# Patient Record
(demographics unavailable — no encounter records)

---

## 2024-11-20 NOTE — HISTORY OF PRESENT ILLNESS
[FreeTextEntry1] : Mrs. Aravind Manuel presented to the office today for follow-up cardiac evaluation.  I evaluated the patient on 2/7/2023 in initial cardiology consultation.  She was referred here at that time by Dr. Mariscal for further evaluation and management of hypertension.  I last evaluated the patient in the office on 7/31/2024.  The patient is a 69-year-old female with a history of hypertension, pre-diabetes, rheumatoid arthritis, osteoarthritis of the knees (status post R TKR 4/26/2023), GERD (including esophagitis), an esophageal stricture, benign colon polyps (4 tubular adenomas resected during initial colonoscopy 4/14/2023), and vitamin D deficiency.  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  The patient has been doing well from a cardiac symptomatic standpoint since her previous visit here on 2/15/2024.  Specifically, she does not report having experienced chest discomfort or dyspnea on exertion in association with her activities.  She has not noted orthopnea, paroxysmal nocturnal dyspnea, or lower extremity edema.  She has infrequently experienced randomly-occurring momentary palpitations ("thumps") over the years, however, she has not experience recurrence of these palpitations since her previous visit here.  She has not experienced any sustained episodes of palpitations or a sensation of tachycardia.  She has not experienced any episodes of presyncope or syncope.  While vacationing in [place] in February 2024, the patient developed nausea/vomiting and diarrhea, for which she was hospitalized in [place] and treated with intravenous hydration and Cipro.  Upon returning home to [place] in March 2024, she was referred for a CT scan of the abdomen/pelvis, which revealed an enlarged uterus, and ovarian cyst, and small pleural effusions, for which she was referred for evaluation by her gynecologist.  She was admitted to [facility] on 3/16/2024 for further evaluation and management, where a chest CTA revealed a left lower lobe pulmonary embolism.  A heparin drip was subsequently initiated.  There was concern that the patient had leiomyosarcoma with cerebella brain metastases.  She was transferred to [facility] on 3/17/2024.  Echocardiography revealed normal left ventricular systolic function (EF 64%) and preserved right ventricular function.  A bedside endometrial biopsy performed on 3/17/2024 was non-diagnostic.  The patient subsequently underwent a lumbar puncture on 3/20/2024 (negative for malignant cells) and hysteroscopy with a uterine biopsy on 3/20/2024, which was interpreted as revealing a smooth muscle neoplasm.  [facility]  It was discovered that the source of her pulmonary embolism was a left femoral DVT.  It was determined that what were thought to be cerebellar metastases were actually embolic cerebellar infarcts.  The patient eventually underwent a ERIC/BSO procedure, with a biopsy revealing low-grade stage Ib leiomyosarcoma.  In view of the suspected embolic cerebellar infarcts detected in March 2024, a 2-week extended Holter monitor study was initiated on 6/29/2024, which did not detect any episodes of paroxysmal atrial fibrillation.  Echocardiography with agitated saline "bubble" contrast was performed on 8/8/2024 to assess for the possibility of a PFO.  This study revealed normal cardiac structural integrity and was unrevealing for evidence of an ASD or PFO.  At the time of the patient's initial visit here on 2/7/2023, I referred her for a 24-hour ambulatory blood pressure monitoring study.  This study was performed on 2/16/2023, revealing the average reading to be 150/91, with 85% of systolic readings being in the elevated range and 80% of diastolic readings being in the elevated range.  The daytime average reading was 154/94 and the nighttime average reading was 113/65 (although it should be noted that only 2 nighttime readings were recorded).  I discussed these findings with the patient on the telephone on 2/21/2023, at which time losartan 25 mg daily was initiated.  The dosage of losartan was increased to 50 mg daily at the time of a follow-up visit with me on 4/12/2023.  As far as risk factors for coronary artery disease are concerned, the patient has essential hypertension (having previously exhibited reactive hypertension over the years) and pre-diabetes.  She does not have a significant dyslipidemia.  She denies a history of cigarette smoking.  She does not have a known immediate family history of premature coronary artery disease.  A lipid profile performed on 7/31/2024 revealed cholesterol 174, triglycerides 203, HDL 49, and calculated LDL 90.  Laboratory studies performed on 9/21/2024 revealed the alkaline phosphatase level to be elevated at 172.  The remainder of the liver chemistries were normal.  The BUN and creatinine were 12 and 0.57, respectively.  The potassium level was 4.3.  The hemoglobin and hematocrit were 12.0 and 38.9, respectively, with hypochromic microcytic indices.  The C-reactive protein was 32 and the ESR was 75 (the patient subsequently resumed sulfasalazine).  Echocardiography most recently performed on 8/8/2024 revealed normal cardiac chamber sizes with normal left ventricular wall thickness.  Left ventricular systolic function was normal, with a calculated ejection fraction of 66%.  Impaired diastolic relaxation of the left ventricle was demonstrated.  Mild mitral regurgitation was demonstrated.  Administration of agitated saline intravenously was negative for evidence of an intracardiac shunt.  Other than previously stated, past medical/surgical history is significant for a D&C procedure for dysfunctional uterine bleeding.  She has had 3 full-term pregnancies, 2 of which were uncomplicated, and 1 of which was associated with gestational diabetes.  All 3 pregnancies were delivered vaginally without complication.

## 2024-11-20 NOTE — CARDIOLOGY SUMMARY
[de-identified] : 11/20/24 -sinus tachycardia at a rate of 102 bpm.  Nonspecific diminished voltage in lead V6.  Nonspecific repolarization abnormalities (baseline artifact).

## 2024-11-20 NOTE — DISCUSSION/SUMMARY
[FreeTextEntry1] : Mrs. 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  She has been doing well from a cardiac symptomatic standpoint since her previous visit here on 7/31/2024.  Specifically, she does not describe having experienced any signs or symptoms to suggest the presence of an anginal syndrome, congestive heart failure, or a hemodynamically-compromising arrhythmia.  Her cardiac examination today is unremarkable.  Her blood pressure reading was moderately elevated upon initial presentation to the office today, with a follow-up measurement obtained after her examination revealing the reading to be satisfactory.  Her electrocardiogram today reveals sinus rhythm with nonspecific diminished voltage in lead V6 and nonspecific repolarization abnormalities (baseline artifact), essentially unchanged from her previous office tracing, allowing for lead placement variation and baseline artifact on the present tracing.  The patient has essential hypertension with a significant reactive component.  I have reviewed the findings of the 24-hour ambulatory blood pressure monitoring study of 2/16/2023 in detail with the patient today.  I have instructed the patient to continue losartan 50 mg daily for the time being, and her blood pressure will be followed.  The etiology of the cerebellar infarcts that the patient experienced in association with her presentation with uterine leiomyosarcoma is uncertain.  She was not detected to be exhibiting paroxysmal atrial fibrillation during her hospitalization, nor was this arrhythmia detected on a subsequent 2-week extended Holter monitor study initiated on 6/9/2024.  Echocardiography performed on 3/17/2024 did not reveal evidence for any significant cardiac structural abnormalities or intracardiac thrombus formation.  In addition, follow-up echocardiography performed on 8/8/2024 with agitated saline "bubble contrast" was unrevealing for intracardiac shunting.  It is suspected that the embolic cerebellar infarcts may have been related to a hypercoagulable state related to the uterine leiomyosarcoma, noting that she was also diagnosed as having DVT/PE at that time.  If she should experience recurrent strokes, insertion of an implantable loop recorder will be recommended to further assess for the possibility of paroxysmal atrial fibrillation.  I have reviewed the findings of the echocardiograms of 3/17/2024 and 8/8/2024 in detail with the patient today.  I have reviewed the findings of the lipid profile of 7/31/2024 in detail with the patient today, pointing out to her that her triglyceride level was elevated, indicating a need for the patient furthering her efforts at dietary modification and weight loss.  The importance of proper dietary habits, proper weight maintenance, and regular exercise as tolerated was discussed with the patient today.  I have asked Mrs. Manuel to call me if she should have any questions or problems pertaining to these matters, and especially if she should experience any concerning symptoms or note persistently elevated home blood pressure readings.  I have otherwise asked her to return to the office for follow-up cardiac evaluation and blood pressure reassessment in 6 months, provided she remains clinically stable in the interim.   [EKG obtained to assist in diagnosis and management of assessed problem(s)] : EKG obtained to assist in diagnosis and management of assessed problem(s)

## 2024-11-20 NOTE — CARDIOLOGY SUMMARY
[de-identified] : 11/20/24 -sinus tachycardia at a rate of 102 bpm.  Nonspecific diminished voltage in lead V6.  Nonspecific repolarization abnormalities (baseline artifact).

## 2024-11-20 NOTE — HISTORY OF PRESENT ILLNESS
[FreeTextEntry1] : Mrs. Aravind Manuel presented to the office today for follow-up cardiac evaluation.  I evaluated the patient on 2/7/2023 in initial cardiology consultation.  She was referred here at that time by Dr. Mariscal for further evaluation and management of hypertension.  I last evaluated the patient in the office on 7/31/2024.  The patient is a 69-year-old female with a history of hypertension, pre-diabetes, rheumatoid arthritis, osteoarthritis of the knees (status post R TKR 4/26/2023), GERD (including esophagitis), an esophageal stricture, benign colon polyps (4 tubular adenomas resected during initial colonoscopy 4/14/2023), and vitamin D deficiency.  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  The patient has been doing well from a cardiac symptomatic standpoint since her previous visit here on 2/15/2024.  Specifically, she does not report having experienced chest discomfort or dyspnea on exertion in association with her activities.  She has not noted orthopnea, paroxysmal nocturnal dyspnea, or lower extremity edema.  She has infrequently experienced randomly-occurring momentary palpitations ("thumps") over the years, however, she has not experience recurrence of these palpitations since her previous visit here.  She has not experienced any sustained episodes of palpitations or a sensation of tachycardia.  She has not experienced any episodes of presyncope or syncope.  While vacationing in [place] in February 2024, the patient developed nausea/vomiting and diarrhea, for which she was hospitalized in [place] and treated with intravenous hydration and Cipro.  Upon returning home to [place] in March 2024, she was referred for a CT scan of the abdomen/pelvis, which revealed an enlarged uterus, and ovarian cyst, and small pleural effusions, for which she was referred for evaluation by her gynecologist.  She was admitted to [facility] on 3/16/2024 for further evaluation and management, where a chest CTA revealed a left lower lobe pulmonary embolism.  A heparin drip was subsequently initiated.  There was concern that the patient had leiomyosarcoma with cerebella brain metastases.  She was transferred to [facility] on 3/17/2024.  Echocardiography revealed normal left ventricular systolic function (EF 64%) and preserved right ventricular function.  A bedside endometrial biopsy performed on 3/17/2024 was non-diagnostic.  The patient subsequently underwent a lumbar puncture on 3/20/2024 (negative for malignant cells) and hysteroscopy with a uterine biopsy on 3/20/2024, which was interpreted as revealing a smooth muscle neoplasm.  The patient was subsequently transferred to [facility] on 3/22/2024.  It was discovered that the source of her pulmonary embolism was a left femoral DVT.  It was determined that what were thought to be cerebellar metastases were actually embolic cerebellar infarcts.  The patient eventually underwent a ERIC/BSO procedure, with a biopsy revealing low-grade stage Ib leiomyosarcoma.  In view of the suspected embolic cerebellar infarcts detected in March 2024, a 2-week extended Holter monitor study was initiated on 6/29/2024, which did not detect any episodes of paroxysmal atrial fibrillation.  Echocardiography with agitated saline "bubble" contrast was performed on 8/8/2024 to assess for the possibility of a PFO.  This study revealed normal cardiac structural integrity and was unrevealing for evidence of an ASD or PFO.  At the time of the patient's initial visit here on 2/7/2023, I referred her for a 24-hour ambulatory blood pressure monitoring study.  This study was performed on 2/16/2023, revealing the average reading to be 150/91, with 85% of systolic readings being in the elevated range and 80% of diastolic readings being in the elevated range.  The daytime average reading was 154/94 and the nighttime average reading was 113/65 (although it should be noted that only 2 nighttime readings were recorded).  I discussed these findings with the patient on the telephone on 2/21/2023, at which time losartan 25 mg daily was initiated.  The dosage of losartan was increased to 50 mg daily at the time of a follow-up visit with me on 4/12/2023.  As far as risk factors for coronary artery disease are concerned, the patient has essential hypertension (having previously exhibited reactive hypertension over the years) and pre-diabetes.  She does not have a significant dyslipidemia.  She denies a history of cigarette smoking.  She does not have a known immediate family history of premature coronary artery disease.  A lipid profile performed on 7/31/2024 revealed cholesterol 174, triglycerides 203, HDL 49, and calculated LDL 90.  Laboratory studies performed on 9/21/2024 revealed the alkaline phosphatase level to be elevated at 172.  The remainder of the liver chemistries were normal.  The BUN and creatinine were 12 and 0.57, respectively.  The potassium level was 4.3.  The hemoglobin and hematocrit were 12.0 and 38.9, respectively, with hypochromic microcytic indices.  The C-reactive protein was 32 and the ESR was 75 (the patient subsequently resumed sulfasalazine).  Echocardiography most recently performed on 8/8/2024 revealed normal cardiac chamber sizes with normal left ventricular wall thickness.  Left ventricular systolic function was normal, with a calculated ejection fraction of 66%.  Impaired diastolic relaxation of the left ventricle was demonstrated.  Mild mitral regurgitation was demonstrated.  Administration of agitated saline intravenously was negative for evidence of an intracardiac shunt.  Other than previously stated, past medical/surgical history is significant for a D&C procedure for dysfunctional uterine bleeding.  She has had 3 full-term pregnancies, 2 of which were uncomplicated, and 1 of which was associated with gestational diabetes.  All 3 pregnancies were delivered vaginally without complication.

## 2024-11-20 NOTE — PHYSICAL EXAM
[No Acute Distress] : no acute distress [Normal Conjunctiva] : normal conjunctiva [No Carotid Bruit] : no carotid bruit [Normal S1, S2] : normal S1, S2 [No Murmur] : no murmur [No Gallop] : no gallop [Clear Lung Fields] : clear lung fields [No Respiratory Distress] : no respiratory distress  [Soft] : abdomen soft [Non Tender] : non-tender [Normal Gait] : normal gait [No Edema] : no edema [No Rash] : no rash [Moves all extremities] : moves all extremities [Alert and Oriented] : alert and oriented [de-identified] : Mildly overweight white female [de-identified] : No JVD is appreciated at a 45 degree angle

## 2024-11-20 NOTE — PHYSICAL EXAM
[No Acute Distress] : no acute distress [Normal Conjunctiva] : normal conjunctiva [No Carotid Bruit] : no carotid bruit [Normal S1, S2] : normal S1, S2 [No Murmur] : no murmur [No Gallop] : no gallop [Clear Lung Fields] : clear lung fields [No Respiratory Distress] : no respiratory distress  [Soft] : abdomen soft [Non Tender] : non-tender [Normal Gait] : normal gait [No Edema] : no edema [No Rash] : no rash [Moves all extremities] : moves all extremities [Alert and Oriented] : alert and oriented [de-identified] : Mildly overweight white female [de-identified] : No JVD is appreciated at a 45 degree angle

## 2024-12-12 NOTE — ASSESSMENT
[FreeTextEntry1] : .    Ms. Manuel is a non-smoking woman with a PMHx of RA, CVA, uterine leiomyosarcoma, GERD, OA s/p knee replacement.  f/u thursday March 6 at 11:30 AM - patient aware - TEB  complicated patient given comobities and a/c  high risk patietn and medical context   # RA dx 15 years ago, seropositive.   has been on MTX (intolerant but effective), SSZ (recently stopped 2/2 eliquis and new dx), HCQ (current).  SSZ restarted (2024 - present) still with pain and swelling -- increased inflammatory markers but much better on the SSZ/HCQ compared iwth the last visit -- continue PT -- no Geovani 2/2 DVT/PE -- although considered now to be in remission from her recent sarcoma, concern would be a TNFi -- if this fails would also consider LEF or RTX  -- continue folic acid -- NO NSAID 2/2 aC -- increases to SSZ 3 tabs BID  -- continue HCQ -- MDP x1  # increased alk phos  chronically elevated  -- sub-serologies for aiLD is negative -- f/u with GI and pcp  # low vitamin d in the past and maintained chronically with weekly supplement  -- continue vitamin d weekly  # Medication monitoring, long term use of drug  -- quant tb negative August 2024 -- hep negative August 2024 -- NO NSAID 2/2 a/c -- no bleedig issue at this time  [email or web address] Sukhwinder Chaidez MD - [facility] [place] [facility] Gynecologic Oncologist [address] [place] [address]  14 mi [phone]   ======================================== More than 50% of the encounter was spent counseling the patient on differential, workup, disease course and treatment/management.  Education was provided to the patient during this encounter.  All questions and concerns were addressed and answered.   The patient verbalized understanding and agreed to the plan.   Patient has been instructed to call for an appointment if new symptoms develop. Patient has been instructed to make a follow-up appointment in 3 months.   Time spent on the encounter included, but is not limited to, preparing to see the patient, obtaining and/or reviewing separately obtained history, performing the evaluation, counseling and educating, independently interpreting results with communication to patient, order placement, referring and/or communicating with other health professionals as described, and documenting clinical information in the electronic health record

## 2024-12-12 NOTE — HISTORY OF PRESENT ILLNESS
[FreeTextEntry1] : This visit was provided via telehealth using real-time 2-way audio-visual technology.    The patient was located at HOME in [place] at the time of the visit.   The provider was located at HOME in [place] at the time of the visit.   The patient and provider participated in the telehealth encounter.   Verbal consent for telehealth services was given by the patient.  Ms. Manuel is a non-smoking woman with a PMHx of RA, CVA, uterine leiomyosarcoma, GERD, OA s/p knee replacement.  INTERVAL Hx  hands are still swollen - the handwriting is stilla nightmare  everything else is good  cant get any of the jewelr on  still swollen  physically doing okay  still stiff in the AM - worse in the am - never perfect during the day  needs MDP - needs the hydroxychrlorwin 1 BID - needs the ssz 2 tabs BID   ____________________________________________________________________________ BACKGROUND / PRESENTATION Developed RA about 15 years ago  - pw acute onset pain, swelling and stiffness in the upper extremities - > positive RF and CCP -> dx RA - has worked with Dr. 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  Tumor is hormone sensitive and her oncologist doesnt want her on a lot of steroids - right knee replacement last year  - embolic stroke - already on the eliquis - has a 4th nerve involvement from this - s/p 20 pound weight loss from this - pending is bubble echo  [facility] - oncology - JASON SANTILLAN MS oncology   SOCIAL  - 3 kids - 1 neurosurgeron attorny and kemal velarde -  -  is Dr. Manuel  Previously seen by Dr. Dez Lou [facility] 2022  - positive RF - positive CCP  Previous treatments  -  mg daily - SSZ 3 tabs BID - folic acid 1 tablet daily

## 2025-01-29 NOTE — DISCUSSION/SUMMARY
[FreeTextEntry1] : 69-year-old female with PMHx of RA, HLD, leiomyosarcoma uterus status post hysterectomy, DVT/PE and multiple cerebellar strokes 3/2024, followed by right fourth cranial neuropathy that has resolved, continues to have mild gait imbalance, pressure in the head and anxiety/labile mood  #Cerebellar strokes likely embolic- hypercoagulability, stroke workup thus far negative.  - I have recommended continuing PT for balance and gait training -Will repeat MRI brain with and without contrast to see evolution/resolution of strokes versus enhancing - I reassured the patient about her condition, have advised her become active physically and socially - Continue Eliquis  #Anxiety/labile mood; not uncommon after strokes and multiple stressors  - Have recommended continuing escitalopram, increase dose to 15 mg.  If mood changes persist, may consider switching escitalopram to sertraline or fluoxetine -I recommended low-dose Xanax 0.25 Mg for sleep and anxiety as needed

## 2025-01-29 NOTE — HISTORY OF PRESENT ILLNESS
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  Patient was started on Eliquis, she followed up with stroke neurologist Dr. Yee, MRI of the brain was repeated the visualized cerebellar lesions no longer enhanced, favored chronic infarcts rather than mets, strokes were thought to be likely ESUS, possibly hypercoagulable in the setting of malignancy, no PFO seen on TTE, APLS labs were negative, LDL 90, Holter monitoring revealed no arrhythmias.   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 Weller, MRI of the orbits no orbital abnormality was identified, cerebellar and periaqueductal lesions suggested evolving subacute infarcts and chronic small vessel ischemic changes, revealed paramedian left parietal lobe- focal calcification or chronic microhemorrhage. Patient reports over the past month or so she has noted resolution of right cranial nerve palsy, she no longer has dizziness/diplopia but reports pressure in the head.  Patient has history of RA, seropositive, has been following up with rheumatology, had been on MTX, HCQ + SSZ, plus folic acid

## 2025-01-29 NOTE — PHYSICAL EXAM
[General Appearance - Alert] : alert [General Appearance - In No Acute Distress] : in no acute distress [Oriented To Time, Place, And Person] : oriented to person, place, and time [Impaired Insight] : insight and judgment were intact [Affect] : the affect was normal [Person] : oriented to person [Place] : oriented to place [Time] : oriented to time [Registration Intact] : recent registration memory intact [Concentration Intact] : normal concentrating ability [Naming Objects] : no difficulty naming common objects [Repeating Phrases] : no difficulty repeating a phrase [Fluency] : fluency intact [Comprehension] : comprehension intact [Past History] : adequate knowledge of personal past history [Cranial Nerves Optic (II)] : visual acuity intact bilaterally,  visual fields full to confrontation, pupils equal round and reactive to light [Cranial Nerves Oculomotor (III)] : extraocular motion intact [Cranial Nerves Trigeminal (V)] : facial sensation intact symmetrically [Cranial Nerves Facial (VII)] : face symmetrical [Cranial Nerves Vestibulocochlear (VIII)] : hearing was intact bilaterally [Cranial Nerves Glossopharyngeal (IX)] : tongue and palate midline [Cranial Nerves Accessory (XI - Cranial And Spinal)] : head turning and shoulder shrug symmetric [Cranial Nerves Hypoglossal (XII)] : there was no tongue deviation with protrusion [Motor Tone] : muscle tone was normal in all four extremities [Motor Strength] : muscle strength was normal in all four extremities [No Muscle Atrophy] : normal bulk in all four extremities [Sensation Tactile Decrease] : light touch was intact [Dysdiadochokinesia On The Left] : present on the left side [2+] : Patella left 2+ [1+] : Ankle jerk left 1+ [PERRL With Normal Accommodation] : pupils were equal in size, round, reactive to light, with normal accommodation [Extraocular Movements] : extraocular movements were intact [Full Visual Field] : full visual field [Outer Ear] : the ears and nose were normal in appearance [Hearing Threshold Finger Rub Not [place]] : hearing was normal [Neck Cervical Mass (___cm)] : no neck mass was observed [Auscultation Breath Sounds / Voice Sounds] : lungs were clear to auscultation bilaterally [Heart Rate And Rhythm] : heart rate was normal and rhythm regular [Heart Sounds] : normal S1 and S2 [Arterial Pulses Carotid] : carotid pulses were normal with no bruits [Edema] : there was no peripheral edema [Involuntary Movements] : no involuntary movements were seen [] : no rash [Tremor] : no tremor present [Plantar Reflex Right Only] : normal on the right [Plantar Reflex Left Only] : normal on the left [FreeTextEntry8] : Giat: Narrow based - lists to right side [FreeTextEntry1] : Stiffness of small joints of hands right more than left

## 2025-01-29 NOTE — REVIEW OF SYSTEMS
[Joint Pain] : joint pain [Joint Stiffness] : joint stiffness [Negative] : Heme/Lymph [Feeling Tired] : feeling tired [Poor Coordination] : poor coordination [Dizziness] : dizziness [As Noted in HPI] : as noted in HPI [Anxiety] : anxiety [Depression] : depression [Emotional Problems] : emotional problems [de-identified] : Pressure in the head

## 2025-01-29 NOTE — DATA REVIEWED
[de-identified] : 7/18/2024: MRI brain previously visualized cerebellar lesion no longer enhance, are smaller better defined and have associated volume loss.  Favor them to represent chronic infarcts on current exam, subacute infarct on previous exam 3/17/24: MRI brain: Numerous enhancing lesions involving the posterior fossa, these findings are likely compatible with underlying metastatic disease given patient's history 3/27/24: MRI of the orbits no orbital abnormality was identified, cerebellar and periaqueductal lesions suggested evolving subacute infarcts and chronic small vessel ischemic changes, revealed paramedian left parietal lobe- focal calcification or chronic microhemorrhage,  [de-identified] : 3/28/24: CT Angio head and neck: Focal stenosis right vertebral artery, V2 segment at C2 level.  No large vessel occlusion.  No significant carotid stenosis

## 2025-01-29 NOTE — DATA REVIEWED
[de-identified] : 7/18/2024: MRI brain previously visualized cerebellar lesion no longer enhance, are smaller better defined and have associated volume loss.  Favor them to represent chronic infarcts on current exam, subacute infarct on previous exam 3/17/24: MRI brain: Numerous enhancing lesions involving the posterior fossa, these findings are likely compatible with underlying metastatic disease given patient's history 3/27/24: MRI of the orbits no orbital abnormality was identified, cerebellar and periaqueductal lesions suggested evolving subacute infarcts and chronic small vessel ischemic changes, revealed paramedian left parietal lobe- focal calcification or chronic microhemorrhage,  [de-identified] : 3/28/24: CT Angio head and neck: Focal stenosis right vertebral artery, V2 segment at C2 level.  No large vessel occlusion.  No significant carotid stenosis

## 2025-01-29 NOTE — REVIEW OF SYSTEMS
[Joint Pain] : joint pain [Joint Stiffness] : joint stiffness [Negative] : Heme/Lymph [Feeling Tired] : feeling tired [Poor Coordination] : poor coordination [Dizziness] : dizziness [As Noted in HPI] : as noted in HPI [Anxiety] : anxiety [Depression] : depression [Emotional Problems] : emotional problems [de-identified] : Pressure in the head

## 2025-01-29 NOTE — REASON FOR VISIT
[Initial Eval - Existing Diagnosis] : an initial evaluation of an existing diagnosis [FreeTextEntry1] : Continuation of care for stroke - Dizziness/feeling off balance

## 2025-01-29 NOTE — PHYSICAL EXAM
[General Appearance - Alert] : alert [General Appearance - In No Acute Distress] : in no acute distress [Impaired Insight] : insight and judgment were intact [Oriented To Time, Place, And Person] : oriented to person, place, and time [Affect] : the affect was normal [Person] : oriented to person [Place] : oriented to place [Time] : oriented to time [Registration Intact] : recent registration memory intact [Concentration Intact] : normal concentrating ability [Naming Objects] : no difficulty naming common objects [Repeating Phrases] : no difficulty repeating a phrase [Fluency] : fluency intact [Comprehension] : comprehension intact [Past History] : adequate knowledge of personal past history [Cranial Nerves Optic (II)] : visual acuity intact bilaterally,  visual fields full to confrontation, pupils equal round and reactive to light [Cranial Nerves Oculomotor (III)] : extraocular motion intact [Cranial Nerves Trigeminal (V)] : facial sensation intact symmetrically [Cranial Nerves Facial (VII)] : face symmetrical [Cranial Nerves Vestibulocochlear (VIII)] : hearing was intact bilaterally [Cranial Nerves Glossopharyngeal (IX)] : tongue and palate midline [Cranial Nerves Accessory (XI - Cranial And Spinal)] : head turning and shoulder shrug symmetric [Cranial Nerves Hypoglossal (XII)] : there was no tongue deviation with protrusion [Motor Tone] : muscle tone was normal in all four extremities [Motor Strength] : muscle strength was normal in all four extremities [No Muscle Atrophy] : normal bulk in all four extremities [Sensation Tactile Decrease] : light touch was intact [Dysdiadochokinesia On The Left] : present on the left side [2+] : Patella left 2+ [1+] : Ankle jerk left 1+ [PERRL With Normal Accommodation] : pupils were equal in size, round, reactive to light, with normal accommodation [Extraocular Movements] : extraocular movements were intact [Full Visual Field] : full visual field [Outer Ear] : the ears and nose were normal in appearance [Hearing Threshold Finger Rub Not [place]] : hearing was normal [Neck Cervical Mass (___cm)] : no neck mass was observed [Auscultation Breath Sounds / Voice Sounds] : lungs were clear to auscultation bilaterally [Heart Rate And Rhythm] : heart rate was normal and rhythm regular [Heart Sounds] : normal S1 and S2 [Arterial Pulses Carotid] : carotid pulses were normal with no bruits [Edema] : there was no peripheral edema [Involuntary Movements] : no involuntary movements were seen [] : no rash [Tremor] : no tremor present [Plantar Reflex Right Only] : normal on the right [Plantar Reflex Left Only] : normal on the left [FreeTextEntry8] : Giat: Narrow based - lists to right side [FreeTextEntry1] : Stiffness of small joints of hands right more than left

## 2025-01-29 NOTE — HISTORY OF PRESENT ILLNESS
Alk phos slightly elevated, history of fatty liver. Alk phos 102. AST and ALT wnl. Has seen GI in past, no cirrhosis.    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  Patient was started on Eliquis, she followed up with stroke neurologist Dr. Yee, MRI of the brain was repeated the visualized cerebellar lesions no longer enhanced, favored chronic infarcts rather than mets, strokes were thought to be likely ESUS, possibly hypercoagulable in the setting of malignancy, no PFO seen on TTE, APLS labs were negative, LDL 90, Holter monitoring revealed no arrhythmias.   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 Weller, MRI of the orbits no orbital abnormality was identified, cerebellar and periaqueductal lesions suggested evolving subacute infarcts and chronic small vessel ischemic changes, revealed paramedian left parietal lobe- focal calcification or chronic microhemorrhage. Patient reports over the past month or so she has noted resolution of right cranial nerve palsy, she no longer has dizziness/diplopia but reports pressure in the head.  Patient has history of RA, seropositive, has been following up with rheumatology, had been on MTX, HCQ + SSZ, plus folic acid

## 2025-01-31 NOTE — ASSESSMENT
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 Recently started on Escitalopram and following up with Neurology in 8 weeks.   Cerebellar strokes likely embolic- hypercoagulability, upcoming MRI to follow up. Will await results.  Currently on Eliquis and she will f/u with Neurology to discuss above. Will need to hold for colonoscopy. She would like to have the surveillance colonoscopy given the high-risk polyp and concern regarding malignancy associated hypercoagulable state/ resolution.  I explained to the patient the risks, alternatives and benefits to a colonoscopy. Risk including but not limited to bleeding, perforation, infection adverse medication reaction. Questions were answered. agreeable to proceed with the planned procedures.  Breakthrough symptoms of GERD with Nexium. Dexilant was not covered. Will add Pepcid 20 mg once or twice a day in evening.  Reviewed and reconciled medications, allergies, PMHx, PSHx, SocHx, FMHx. Reviewed imaging, blood work, diagnostic testing, discussed with patient. Further recommendations pending results of above work up and evaluation. Recommendations reviewed with patient during this office visit, and all questions answered; Patient instructed on the importance of follow up and verbalizes understanding.  I personally performed the evaluation and management (E/M) services for this established patient who presents today with (a) new problem(s)/exacerbation of (an) existing condition(s). That E/M includes conducting the examination, assessing all new/exacerbated conditions, and establishing a new plan of care this new problem/exacerbated condition to be followed going forward.

## 2025-01-31 NOTE — REASON FOR VISIT
[Follow-up] : a follow-up of an existing diagnosis [FreeTextEntry1] : surveillance colonoscopy for high risk colon polyp, TVA

## 2025-01-31 NOTE — DATA REVIEWED
[de-identified] : 7/18/2024: MRI brain previously visualized cerebellar lesion no longer enhance, are smaller better defined and have associated volume loss.  Favor them to represent chronic infarcts on current exam, subacute infarct on previous exam 3/17/24: MRI brain: Numerous enhancing lesions involving the posterior fossa, these findings are likely compatible with underlying metastatic disease given patient's history 3/27/24: MRI of the orbits no orbital abnormality was identified, cerebellar and periaqueductal lesions suggested evolving subacute infarcts and chronic small vessel ischemic changes, revealed paramedian left parietal lobe- focal calcification or chronic microhemorrhage,  [de-identified] : 3/28/24: CT Angio head and neck: Focal stenosis right vertebral artery, V2 segment at C2 level.  No large vessel occlusion.  No significant carotid stenosis

## 2025-01-31 NOTE — REVIEW OF SYSTEMS
[Feeling Tired] : feeling tired [Poor Coordination] : poor coordination [Dizziness] : dizziness [Anxiety] : anxiety [Depression] : depression [Emotional Problems] : emotional problems [As Noted in HPI] : as noted in HPI [Joint Pain] : joint pain [Joint Stiffness] : joint stiffness [Negative] : Heme/Lymph [de-identified] : Pressure in the head

## 2025-01-31 NOTE — PHYSICAL EXAM
[General Appearance - Alert] : alert [General Appearance - In No Acute Distress] : in no acute distress [Oriented To Time, Place, And Person] : oriented to person, place, and time [Impaired Insight] : insight and judgment were intact [Affect] : the affect was normal [Auscultation Breath Sounds / Voice Sounds] : lungs were clear to auscultation bilaterally [Heart Rate And Rhythm] : heart rate was normal and rhythm regular [Heart Sounds] : normal S1 and S2 [Arterial Pulses Carotid] : carotid pulses were normal with no bruits [Edema] : there was no peripheral edema [] : no rash [FreeTextEntry1] : Stiffness of small joints of hands right more than left

## 2025-01-31 NOTE — HISTORY OF PRESENT ILLNESS
[FreeTextEntry1] : Pleasant 68 yo F prior eval for + cologuard and dysphagia presents for follow-up. Reports no current GI complaints [i.e. acid reflux is well controlled on medication (i.e. Dexlansoprazole 60 mg/morning), and is having regular BMs]. Pt is s/p recent TKA. 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 Recently started on Escitalopram and following up with Neurology in 8 weeks.   Cerebellar strokes likely embolic- hypercoagulability, upcoming MRI to follow up. Will await results.  Currently on Eliquis and she will f/u with Neurology to discuss above. Will need to hold for colonoscopy. She would like to have the surveillance colonoscopy given the high-risk polyp and concern regarding malignancy associated hypercoagulable state/ resolution.

## 2025-01-31 NOTE — CONSULT LETTER
[Dear  ___] : Dear  [unfilled], [Courtesy Letter:] : I had the pleasure of seeing your patient, [unfilled], in my office today. [Please see my note below.] : Please see my note below. [Consult Closing:] : Thank you very much for allowing me to participate in the care of this patient.  If you have any questions, please do not hesitate to contact me. [Sincerely,] : Sincerely, [FreeTextEntry3] :  Lesly Luther MD Gastroenterology, Hepatology and Motility

## 2025-03-20 NOTE — ASSESSMENT
[FreeTextEntry1] : .    Ms. Manuel is a non-smoking woman with a PMHx of RA, CVA, uterine leiomyosarcoma, GERD, OA s/p knee replacement.  f/u thursday June 26 at 10:00 AM - patient aware - TEB  complicated patient given comobities and a/c  high risk patietn and medical context   # RA dx 15 years ago, seropositive.   has been on MTX (intolerant but effective), SSZ (recently stopped 2/2 eliquis and new dx), HCQ (current).  SSZ restarted (2024 - present) still with pain and swelling -- increased inflammatory markers but much better on the SSZ/HCQ compared iwth the last visit -- continue PT -- NO Geovani 2/2 DVT/PE -- AVOIDING TNFi - although considered now to be in remission from her recent sarcoma, concern would be a TNFi -- if this fails would also consider LEF or RTX  -- continue folic acid -- NO NSAID 2/2 aC -- continue SSZ 3 tabs BID  -- continue HCQ  # increased alk phos  chronically elevated  -- sub-serologies for aiLD is negative -- f/u with GI and pcp  # low vitamin d in the past and maintained chronically with weekly supplement  -- continue vitamin d weekly  # Medication monitoring, long term use of drug  -- quant tb negative August 2024 -- hep negative August 2024 -- NO NSAID 2/2 a/c -- NO Geovani 2/2 blood clot  -- AVOID TNFi 2/2 h/o sarcoma -- no bleedig issue at this time  [email or web address] Sukhwinder Chaidez MD - [facility] Gynecologic Oncologist [address] [place] [address]  14 mi [phone]   ======================================== More than 50% of the encounter was spent counseling the patient on differential, workup, disease course and treatment/management.  Education was provided to the patient during this encounter.  All questions and concerns were addressed and answered.   The patient verbalized understanding and agreed to the plan.   Patient has been instructed to call for an appointment if new symptoms develop. Patient has been instructed to make a follow-up appointment in 3 months.   Time spent on the encounter included, but is not limited to, preparing to see the patient, obtaining and/or reviewing separately obtained history, performing the evaluation, counseling and educating, independently interpreting results with communication to patient, order placement, referring and/or communicating with other health professionals as described, and documenting clinical information in the electronic health record

## 2025-03-20 NOTE — HISTORY OF PRESENT ILLNESS
[FreeTextEntry1] : This visit was provided via telehealth using real-time 2-way audio-visual technology.    The patient was located at HOME in [place] at the time of the visit.   The provider was located at [place] in [place] at the time of the visit.   The patient and provider participated in the telehealth encounter.   Verbal consent for telehealth services was given by the patient.  Ms. Manuel is a non-smoking woman with a PMHx of RA, CVA, uterine leiomyosarcoma, GERD, OA s/p knee replacement.  INTERVAL Hx  in terms of the joints  - doing better  - much more managable  - the swellign is coming down nicely  - no more pain  - th ePT is referring her for vestibular testing   in terms fo the medicaiton  - adherent and tolerable     ____________________________________________________________________________ BACKGROUND / PRESENTATION Developed RA about 15 years ago  - pw acute onset pain, swelling and stiffness in the upper extremities - > positive RF and CCP -> dx RA - has worked with Dr. 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  Tumor is hormone sensitive and her oncologist doesnt want her on a lot of steroids - right knee replacement last year  - embolic stroke - already on the eliquis - has a 4th nerve involvement from this - s/p 20 pound weight loss from this - pending is bubble echo  [facility] - oncology - [facility]   SOCIAL  - 3 kids - 1 neurosurgeron attorny and kemal velarde -  -  is Dr. Manuel  Previously seen by Dr. Dez Lou [facility] 2022  - positive RF - positive CCP  Previous treatments  -  mg daily - SSZ 3 tabs BID - folic acid 1 tablet daily

## 2025-04-27 NOTE — ASSESSMENT
[FreeTextEntry1] : .    Ms. Manuel is a non-smoking woman with a PMHx of RA, CVA, uterine leiomyosarcoma, GERD, OA s/p knee replacement.  f/u thursday June 26 at 10:00 AM - patient aware - TEB  complicated patient given comobities and a/c  high risk patietn and medical context  acute visit  #vaccine counseling -- Patient instructed not to have live vaccines. -- Patient instructed that she can have non-live vaccines, such as shingles, flu and covid.  -- Patient advised that vaccine effectiveness may be blunted by some rheum meds. -- agree with shingles shot - patient immunosuppressed and age 69 -> high risk for shingles -- now with recent flare triggered by pneumonia vaccine - and likely will have same reaction to the shingles vaccine.  -- to maximize vaccine effectiveness - will hold the SSZ for a week before and 10 - 14 days after  -- will have a MDP in case of emergency  # RA dx 15 years ago, seropositive.   has been on MTX (intolerant but effective), SSZ (recently stopped 2/2 eliquis and new dx), HCQ (current).  SSZ restarted (2024 - present) still with pain and swelling -- increased inflammatory markers but much better on the SSZ/HCQ compared iwth the last visit -- continue PT -- NO Geovani 2/2 DVT/PE -- AVOIDING TNFi - although considered now to be in remission from her recent sarcoma, concern would be a TNFi -- if this fails would also consider LEF or RTX  -- continue folic acid -- NO NSAID 2/2 aC -- continue SSZ 3 tabs BID  -- continue HCQ -- still inflare x 3 weeks - MDP for rescue  # increased alk phos  chronically elevated  -- sub-serologies for aiLD is negative -- f/u with GI and pcp  # low vitamin d in the past and maintained chronically with weekly supplement  -- continue vitamin d weekly  # Medication monitoring, long term use of drug  -- quant tb negative August 2024 -- hep negative August 2024 -- NO NSAID 2/2 a/c -- NO Geovani 2/2 blood clot  -- AVOID TNFi 2/2 h/o sarcoma -- no bleedig issue at this time  [email or web address] Sukhwinder Chaidez MD - [facility] Gynecologic Oncologist [address] [place] [address]  14 mi [phone]   ======================================== More than 50% of the encounter was spent counseling the patient on differential, workup, disease course and treatment/management.  Education was provided to the patient during this encounter.  All questions and concerns were addressed and answered.   The patient verbalized understanding and agreed to the plan.   Patient has been instructed to call for an appointment if new symptoms develop. Patient has been instructed to make a follow-up appointment in 3 months.   Time spent on the encounter included, but is not limited to, preparing to see the patient, obtaining and/or reviewing separately obtained history, performing the evaluation, counseling and educating, independently interpreting results with communication to patient, order placement, referring and/or communicating with other health professionals as described, and documenting clinical information in the electronic health record

## 2025-04-27 NOTE — HISTORY OF PRESENT ILLNESS
[FreeTextEntry1] : This visit was provided via telehealth using real-time 2-way audio-visual technology.    The patient was located at HOME in [place] at the time of the visit.   The provider was located at HOME in [place] at the time of the visit.   The patient and provider participated in the telehealth encounter.   Verbal consent for telehealth services was given by the patient.  Ms. Manuel is a non-smoking woman with a PMHx of RA, CVA, uterine leiomyosarcoma, GERD, OA s/p knee replacement.  INTERVAL Hx asap visit re recent acute issue  s/p vaccine with flare in the joints  wants to know about the shingles shot right wrist and hand was bad - still a bit stiff and ssore though - not back to her baseline prior to the vaccine  no other AE from the vaccine  no fever, chills and there was no trauma  in terms of the joints  - was doing better up until this point  in terms fo the medication  - adherent and tolerable     ____________________________________________________________________________ BACKGROUND / PRESENTATION Developed RA about 15 years ago  - pw acute onset pain, swelling and stiffness in the upper extremities - > positive RF and CCP -> dx RA - has worked with Dr. 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 Manuel  Previously seen by Dr. Dez Lou [facility] 2022  - positive RF - positive CCP  Previous treatments  -  mg daily - SSZ 3 tabs BID - folic acid 1 tablet daily

## 2025-04-27 NOTE — HISTORY OF PRESENT ILLNESS
[FreeTextEntry1] : This visit was provided via telehealth using real-time 2-way audio-visual technology.    The patient was located at HOME in [place] at the time of the visit.   The provider was located at HOME in [place] at the time of the visit.   The patient and provider participated in the telehealth encounter.   Verbal consent for telehealth services was given by the patient.  Ms. Manuel is a non-smoking woman with a PMHx of RA, CVA, uterine leiomyosarcoma, GERD, OA s/p knee replacement.  INTERVAL Hx asap visit re recent acute issue  s/p vaccine with flare in the joints  wants to know about the shingles shot right wrist and hand was bad - still a bit stiff and ssore though - not back to her baseline prior to the vaccine  no other AE from the vaccine  no fever, chills and there was no trauma  in terms of the joints  - was doing better up until this point  in terms fo the medication  - adherent and tolerable     ____________________________________________________________________________ BACKGROUND / PRESENTATION Developed RA about 15 years ago  - pw acute onset pain, swelling and stiffness in the upper extremities - > positive RF and CCP -> dx RA - has worked with Dr. 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  Tumor is hormone sensitive and her oncologist doesnt want her on a lot of steroids - right knee replacement last year  - embolic stroke - already on the eliquis - has a 4th nerve involvement from this - s/p 20 pound weight loss from this - pending is bubble echo  [facility] - oncology - JASON FISHERLEY [facility] [place] [facility]   SOCIAL  - 3 kids - 1 neurosurgeron attorny and kemal velarde -  -  is Dr. Manuel  Previously seen by Dr. Dez Lou [facility] 2022  - positive RF - positive CCP  Previous treatments  -  mg daily - SSZ 3 tabs BID - folic acid 1 tablet daily

## 2025-04-27 NOTE — ASSESSMENT
[FreeTextEntry1] : .    Ms. Manuel is a non-smoking woman with a PMHx of RA, CVA, uterine leiomyosarcoma, GERD, OA s/p knee replacement.  f/u thursday June 26 at 10:00 AM - patient aware - TEB  complicated patient given comobities and a/c  high risk patietn and medical context  acute visit  #vaccine counseling -- Patient instructed not to have live vaccines. -- Patient instructed that she can have non-live vaccines, such as shingles, flu and covid.  -- Patient advised that vaccine effectiveness may be blunted by some rheum meds. -- agree with shingles shot - patient immunosuppressed and age 69 -> high risk for shingles -- now with recent flare triggered by pneumonia vaccine - and likely will have same reaction to the shingles vaccine.  -- to maximize vaccine effectiveness - will hold the SSZ for a week before and 10 - 14 days after  -- will have a MDP in case of emergency  # RA dx 15 years ago, seropositive.   has been on MTX (intolerant but effective), SSZ (recently stopped 2/2 eliquis and new dx), HCQ (current).  SSZ restarted (2024 - present) still with pain and swelling -- increased inflammatory markers but much better on the SSZ/HCQ compared iwth the last visit -- continue PT -- NO Geovani 2/2 DVT/PE -- AVOIDING TNFi - although considered now to be in remission from her recent sarcoma, concern would be a TNFi -- if this fails would also consider LEF or RTX  -- continue folic acid -- NO NSAID 2/2 aC -- continue SSZ 3 tabs BID  -- continue HCQ -- still inflare x 3 weeks - MDP for rescue  # increased alk phos  chronically elevated  -- sub-serologies for aiLD is negative -- f/u with GI and pcp  # low vitamin d in the past and maintained chronically with weekly supplement  -- continue vitamin d weekly  # Medication monitoring, long term use of drug  -- quant tb negative August 2024 -- hep negative August 2024 -- NO NSAID 2/2 a/c -- NO Geovani 2/2 blood clot  -- AVOID TNFi 2/2 h/o sarcoma -- no bleedig issue at this time  [email or web address] Sukhwinder Chaidez MD - [facility] Gynecologic Oncologist [address] [place] [address]  14 mi [phone]   ======================================== More than 50% of the encounter was spent counseling the patient on differential, workup, disease course and treatment/management.  Education was provided to the patient during this encounter.  All questions and concerns were addressed and answered.   The patient verbalized understanding and agreed to the plan.   Patient has been instructed to call for an appointment if new symptoms develop. Patient has been instructed to make a follow-up appointment in 3 months.   Time spent on the encounter included, but is not limited to, preparing to see the patient, obtaining and/or reviewing separately obtained history, performing the evaluation, counseling and educating, independently interpreting results with communication to patient, order placement, referring and/or communicating with other health professionals as described, and documenting clinical information in the electronic health record

## 2025-04-30 NOTE — PHYSICAL EXAM
[General Appearance - Alert] : alert [General Appearance - In No Acute Distress] : in no acute distress [Oriented To Time, Place, And Person] : oriented to person, place, and time [Impaired Insight] : insight and judgment were intact [Affect] : the affect was normal [Person] : oriented to person [Place] : oriented to place [Time] : oriented to time [Registration Intact] : recent registration memory intact [Concentration Intact] : normal concentrating ability [Naming Objects] : no difficulty naming common objects [Repeating Phrases] : no difficulty repeating a phrase [Fluency] : fluency intact [Comprehension] : comprehension intact [Past History] : adequate knowledge of personal past history [Cranial Nerves Optic (II)] : visual acuity intact bilaterally,  visual fields full to confrontation, pupils equal round and reactive to light [Cranial Nerves Oculomotor (III)] : extraocular motion intact [Cranial Nerves Trigeminal (V)] : facial sensation intact symmetrically [Cranial Nerves Facial (VII)] : face symmetrical [Cranial Nerves Vestibulocochlear (VIII)] : hearing was intact bilaterally [Cranial Nerves Glossopharyngeal (IX)] : tongue and palate midline [Cranial Nerves Accessory (XI - Cranial And Spinal)] : head turning and shoulder shrug symmetric [Cranial Nerves Hypoglossal (XII)] : there was no tongue deviation with protrusion [Motor Tone] : muscle tone was normal in all four extremities [Motor Strength] : muscle strength was normal in all four extremities [No Muscle Atrophy] : normal bulk in all four extremities [Sensation Tactile Decrease] : light touch was intact [Dysdiadochokinesia On The Left] : present on the left side [2+] : Patella left 2+ [1+] : Ankle jerk left 1+ [PERRL With Normal Accommodation] : pupils were equal in size, round, reactive to light, with normal accommodation [Extraocular Movements] : extraocular movements were intact [Full Visual Field] : full visual field [Outer Ear] : the ears and nose were normal in appearance [Hearing Threshold Finger Rub Not [place]] : hearing was normal [] : the neck was supple [Neck Cervical Mass (___cm)] : no neck mass was observed

## 2025-04-30 NOTE — PHYSICAL EXAM
[General Appearance - Alert] : alert [General Appearance - In No Acute Distress] : in no acute distress [Oriented To Time, Place, And Person] : oriented to person, place, and time [Impaired Insight] : insight and judgment were intact [Affect] : the affect was normal [Person] : oriented to person [Place] : oriented to place [Time] : oriented to time [Registration Intact] : recent registration memory intact [Concentration Intact] : normal concentrating ability [Naming Objects] : no difficulty naming common objects [Repeating Phrases] : no difficulty repeating a phrase [Fluency] : fluency intact [Comprehension] : comprehension intact [Past History] : adequate knowledge of personal past history [Cranial Nerves Optic (II)] : visual acuity intact bilaterally,  visual fields full to confrontation, pupils equal round and reactive to light [Cranial Nerves Oculomotor (III)] : extraocular motion intact [Cranial Nerves Trigeminal (V)] : facial sensation intact symmetrically [Cranial Nerves Facial (VII)] : face symmetrical [Cranial Nerves Vestibulocochlear (VIII)] : hearing was intact bilaterally [Cranial Nerves Glossopharyngeal (IX)] : tongue and palate midline [Cranial Nerves Accessory (XI - Cranial And Spinal)] : head turning and shoulder shrug symmetric [Cranial Nerves Hypoglossal (XII)] : there was no tongue deviation with protrusion [Motor Tone] : muscle tone was normal in all four extremities [Motor Strength] : muscle strength was normal in all four extremities [No Muscle Atrophy] : normal bulk in all four extremities [Sensation Tactile Decrease] : light touch was intact [Dysdiadochokinesia On The Left] : present on the left side [2+] : Patella left 2+ [1+] : Ankle jerk left 1+ [PERRL With Normal Accommodation] : pupils were equal in size, round, reactive to light, with normal accommodation [Extraocular Movements] : extraocular movements were intact [Full Visual Field] : full visual field [Outer Ear] : the ears and nose were normal in appearance [Hearing Threshold Finger Rub Not [place]] : hearing was normal [] : the neck was supple [Neck Cervical Mass (___cm)] : no neck mass was observed

## 2025-04-30 NOTE — REVIEW OF SYSTEMS
[Feeling Tired] : feeling tired [Poor Coordination] : poor coordination [Dizziness] : dizziness [Anxiety] : anxiety [Depression] : depression [Emotional Problems] : emotional problems [As Noted in HPI] : as noted in HPI [Joint Pain] : joint pain [Joint Stiffness] : joint stiffness [Negative] : Heme/Lymph

## 2025-05-01 NOTE — PHYSICAL EXAM
[Alert] : alert [Oriented x 3] : ~L oriented x 3 [Well Nourished] : well nourished [Conjunctiva Non-injected] : conjunctiva non-injected [No Visual Lymphadenopathy] : no visual  lymphadenopathy [No Clubbing] : no clubbing [No Edema] : no edema [No Bromhidrosis] : no bromhidrosis [No Chromhidrosis] : no chromhidrosis [FreeTextEntry3] : The patient is well-appearing, in no acute distress, alert and oriented x 3. Mood and affect are normal. A complete cutaneous examination of the scalp, face, neck, chest, abdomen, back, bilateral arms, bilateral legs, buttocks, digits, nails, eyelids, conjunctiva and lips reveals the following significant findings: -Tan to dark brown macules on the trunk and extremities with no concerning dermoscopic features  -Tan to dark brown stuck-on plaques on the trunk and extremities

## 2025-05-01 NOTE — HISTORY OF PRESENT ILLNESS
[FreeTextEntry1] : spots on the skin [de-identified] : 69F with no personal hx of skin cancer but +fam hx of MM (Father) here for spots on the face and trunk. Otherwise, no new, evolving, or symptomatic skin lesions.

## 2025-05-27 NOTE — CARDIOLOGY SUMMARY
[de-identified] : 5/27/25 -sinus rhythm at a rate of 87 bpm.  Nonspecific diminished voltage in leads V4-V6.  Nonspecific T wave abnormalities.

## 2025-05-27 NOTE — PHYSICAL EXAM
[No Acute Distress] : no acute distress [Normal Conjunctiva] : normal conjunctiva [No Carotid Bruit] : no carotid bruit [Normal S1, S2] : normal S1, S2 [No Murmur] : no murmur [No Gallop] : no gallop [Clear Lung Fields] : clear lung fields [No Respiratory Distress] : no respiratory distress  [Soft] : abdomen soft [Non Tender] : non-tender [Normal Gait] : normal gait [No Edema] : no edema [No Rash] : no rash [Moves all extremities] : moves all extremities [Alert and Oriented] : alert and oriented [de-identified] : Mildly overweight white female [de-identified] : No JVD is appreciated at a 45 degree angle

## 2025-05-27 NOTE — HISTORY OF PRESENT ILLNESS
[FreeTextEntry1] : Mrs. Aravind Manuel presented to the office today for follow-up cardiac evaluation.  I evaluated the patient on 2/7/2023 in initial cardiology consultation.  She was referred here at that time by Dr. Mariscal for further evaluation and management of hypertension.  I last evaluated the patient in the office on 11/20/2024.  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  The patient has been doing well from a cardiac symptomatic standpoint since her previous visit here on 11/20/2024.  Specifically, she does not report having experienced chest discomfort or dyspnea on exertion in association with her activities.  She has not noted orthopnea, paroxysmal nocturnal dyspnea, or lower extremity edema.  She has infrequently experienced randomly-occurring momentary palpitations ("thumps") over the years, however, she has not experience recurrence of these palpitations since her previous visit here.  She has not experienced any sustained episodes of palpitations or a sensation of tachycardia.  She has not experienced any episodes of presyncope or syncope.  While vacationing in [place] in February 2024, the patient developed nausea/vomiting and diarrhea, for which she was hospitalized in [place] and treated with intravenous hydration and Cipro.  Upon returning home to [place] in March 2024, she was referred for a CT scan of the abdomen/pelvis, which revealed an enlarged uterus, and ovarian cyst, and small pleural effusions, for which she was referred for evaluation by her gynecologist.  She was admitted to [facility] on 3/16/2024 for further evaluation and management, where a chest CTA revealed a left lower lobe pulmonary embolism.  A heparin drip was subsequently initiated.  There was concern that the patient had leiomyosarcoma with cerebella brain metastases.  She was transferred to [facility] on 3/17/2024.  Echocardiography revealed normal left ventricular systolic function (EF 64%) and preserved right ventricular function.  A bedside endometrial biopsy performed on 3/17/2024 was non-diagnostic.  The patient subsequently underwent a lumbar puncture on 3/20/2024 (negative for malignant cells) and hysteroscopy with a uterine biopsy on 3/20/2024, which was interpreted as revealing a smooth muscle neoplasm.  The patient was subsequently transferred to [facility] on 3/22/2024.  It was discovered that the source of her pulmonary embolism was a left femoral DVT.  It was determined that what were thought to be cerebellar metastases were actually embolic cerebellar infarcts.  The patient eventually underwent a ERIC/BSO procedure, with a biopsy revealing low-grade stage Ib leiomyosarcoma.  A brain MRI scan most recently performed on 2/8/2025 revealed numerous chronic bilateral cerebellar infarcts, for which the patient's neurologist recommended continuing anticoagulation (Eliquis).  In view of the suspected embolic cerebellar infarcts detected in March 2024, a 2-week extended Holter monitor study was initiated on 6/29/2024, which did not detect any episodes of paroxysmal atrial fibrillation.  Echocardiography with agitated saline "bubble" contrast was performed on 8/8/2024 to assess for the possibility of a PFO.  This study revealed normal cardiac structural integrity and was unrevealing for evidence of an ASD or PFO.  At the time of the patient's initial visit here on 2/7/2023, I referred her for a 24-hour ambulatory blood pressure monitoring study.  This study was performed on 2/16/2023, revealing the average reading to be 150/91, with 85% of systolic readings being in the elevated range and 80% of diastolic readings being in the elevated range.  The daytime average reading was 154/94 and the nighttime average reading was 113/65 (although it should be noted that only 2 nighttime readings were recorded).  I discussed these findings with the patient on the telephone on 2/21/2023, at which time losartan 25 mg daily was initiated.  The dosage of losartan was increased to 50 mg daily at the time of a follow-up visit with me on 4/12/2023.  As far as risk factors for coronary artery disease are concerned, the patient has essential hypertension (having previously exhibited reactive hypertension over the years) and pre-diabetes.  She does not have a significant dyslipidemia.  She denies a history of cigarette smoking.  She does not have a known immediate family history of premature coronary artery disease.  A lipid profile performed on 4/8/2025 revealed cholesterol 221, triglycerides 223, HDL 64, and calculated .  Laboratory studies performed on 3/15/2025 revealed the alkaline phosphatase level to be elevated at 183.  The remainder of the liver chemistries were normal.  The BUN and creatinine were 15 and 0.6, respectively.  The potassium level was 4.6.  The hemoglobin and hematocrit were 12.2 and 38.9, respectively.  Echocardiography most recently performed on 8/8/2024 revealed normal cardiac chamber sizes with normal left ventricular wall thickness.  Left ventricular systolic function was normal, with a calculated ejection fraction of 66%.  Impaired diastolic relaxation of the left ventricle was demonstrated.  Mild mitral regurgitation was demonstrated.  Administration of agitated saline intravenously was negative for evidence of an intracardiac shunt.  Other than previously stated, past medical/surgical history is significant for a D&C procedure for dysfunctional uterine bleeding.  She has had 3 full-term pregnancies, 2 of which were uncomplicated, and 1 of which was associated with gestational diabetes.  All 3 pregnancies were delivered vaginally without complication.

## 2025-05-27 NOTE — DISCUSSION/SUMMARY
[FreeTextEntry1] : Mrs. 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  She has been doing well from a cardiac symptomatic standpoint since her previous visit here on 11/20/2024.  Specifically, she does not describe having experienced any signs or symptoms to suggest the development of an anginal syndrome, congestive heart failure, or a hemodynamically-compromising arrhythmia.  Her cardiac examination today is unremarkable.  Her blood pressure reading is moderately elevated today.  Her electrocardiogram today reveals sinus rhythm with nonspecific diminished voltage in leads V4-V6 and nonspecific T wave abnormalities, essentially unchanged from her previous office tracing, allowing for lead placement variation and baseline artifact on the present tracing.  The patient has essential hypertension with a significant reactive component.  I have reviewed the findings of the 24-hour ambulatory blood pressure monitoring study of 2/16/2023 in detail with the patient today.  In view of the patient exhibiting a moderately elevated blood pressure reading in the office today, as well as describing elevated home blood pressure readings, I have instructed her to increase the dosage of losartan to 100 mg daily in an effort to more optimally control her blood pressure.  I have electronically prescribed the higher dosage of losartan to the patient's mail-order pharmacy for her today, and I have asked her to call me if she has any difficulty tolerating the increased dosage of the losartan.  The etiology of the cerebellar infarcts that the patient experienced in association with her presentation with uterine leiomyosarcoma is uncertain.  She was not detected to be exhibiting paroxysmal atrial fibrillation during her hospitalization, nor was this arrhythmia detected on a subsequent 2-week extended Holter monitor study initiated on 6/9/2024.  Echocardiography performed on 3/17/2024 did not reveal evidence for any significant cardiac structural abnormalities or intracardiac thrombus formation.  In addition, follow-up echocardiography performed on 8/8/2024 with agitated saline "bubble contrast" was unrevealing for intracardiac shunting.  It is suspected that the embolic cerebellar infarcts may have been related to a hypercoagulable state related to the uterine leiomyosarcoma, noting that she was also diagnosed as having DVT/PE at that time.  If she should experience recurrent strokes, insertion of an implantable loop recorder will be recommended to further assess for the possibility of paroxysmal atrial fibrillation.  The patient most recently underwent a brain MRI study on 2/8/2025, revealing numerous chronic bilateral cerebellar infarcts, for which her neurologist recommended continuing anticoagulation with Eliquis.  I have reviewed the findings of the echocardiograms of 3/17/2024 and 8/8/2024 in detail with the patient today.  I have reviewed the findings of the lipid profile of 4/8/2025 in detail with the patient today, explaining to her that this blood test revealed evidence for a type IIb dyslipidemia.  In view of the patient having had cerebella infarcts, I have recommended statin therapy for treatment of her dyslipidemia.  She is agreeable, and I have electronically prescribed Crestor 5 mg daily to her mail-order pharmacy for her today.  The potential adverse effects associated with statin therapy were discussed with the patient today, and I have asked her to call me if she should have any difficulty tolerating this medication.  Follow-up blood testing will be planned in 6 to 8 weeks for reassessment.  The importance of proper dietary habits, proper weight maintenance, and regular exercise as tolerated was discussed with the patient today.  I am referring the patient for a cardiac CT calcium scoring study for further cardiovascular risk stratification.  I have asked Mrs. Manuel to call me if she should have any questions or problems pertaining to these matters, and especially if she should experience any concerning symptoms or note persistently elevated home blood pressure readings.  I have otherwise asked her to return to the office for follow-up cardiac evaluation and blood pressure reassessment in 3-4 months, provided she remains clinically stable in the interim.   [EKG obtained to assist in diagnosis and management of assessed problem(s)] : EKG obtained to assist in diagnosis and management of assessed problem(s)

## 2025-06-26 NOTE — HISTORY OF PRESENT ILLNESS
[FreeTextEntry1] : This visit was provided via telehealth using real-time 2-way audio-visual technology.    The patient was located at HOME in [place] at the time of the visit.   The provider was located at HOME in [place] at the time of the visit.   The patient and provider participated in the telehealth encounter.   Verbal consent for telehealth services was given by the patient.  Ms. Manuel is a non-smoking woman with a PMHx of RA, CVA, uterine leiomyosarcoma, GERD, OA s/p knee replacement.  INTERVAL Hx  in terms of the vaccines  - started the shingrex - 1 month - felt great after  - had stopped the SSZ -  has held it and now back on it (restarted it on Sunday) - has had the colonoscopy - everything was okay but 8 polyps  - had a calcium ct scan for the heart  - dr powers the cardiologist - increased the losartan to 100 mg  - dr powers also started her on a statin - now on rosuvastatin - no muscle pains  - the gi doctor put her on boquenza - had massive diarrhea - had reflux  - the joints didn't falre up at all  - the weather makes her feel miserable -     ____________________________________________________________________________ BACKGROUND / PRESENTATION Developed RA about 15 years ago  - pw acute onset pain, swelling and stiffness in the upper extremities - > positive RF and CCP -> dx RA - has worked with Dr. 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  Tumor is hormone sensitive and her oncologist doesnt want her on a lot of steroids - right knee replacement last year  - embolic stroke - already on the eliquis - has a 4th nerve involvement from this - s/p 20 pound weight loss from this - pending is bubble echo  [facility] - oncology - JASON SANTILLAN MS oncology   SOCIAL  - 3 kids - 1 neurosurgeron attorny and kemal velarde -  -  is Dr. Manuel  Previously seen by Dr. Dez Lou [facility] 2022  - positive RF - positive CCP  Previous treatments  -  mg daily - SSZ 3 tabs BID - folic acid 1 tablet daily

## 2025-06-26 NOTE — ASSESSMENT
[FreeTextEntry1] : .    Ms. Manuel is a non-smoking woman with a PMHx of RA, CVA, uterine leiomyosarcoma, GERD, OA s/p knee replacement.  f/u thursday October 30 at 12:00 PM - patient aware - TEB  complicated patient given commodities and a/c  high risk patietn and medical context    #vaccine counseling -- Patient instructed not to have live vaccines. -- Patient instructed that she can have non-live vaccines, such as shingles, flu and covid.  -- Patient advised that vaccine effectiveness may be blunted by some rheum meds. -- agree with shingles shot - patient immunosuppressed and age 69 -> high risk for shingles -- now with recent flare triggered by pneumonia vaccine - and likely will have same reaction to the shingles vaccine.  -- to maximize vaccine effectiveness - will hold the SSZ for a week before and 10 - 14 days after  -- will have a MDP in case of emergency -- s/p shingles vaccine #1 and did fine   # RA dx 15 years ago, seropositive.   has been on MTX (intolerant but effective), SSZ (recently stopped 2/2 eliquis and new dx), HCQ (current).  SSZ restarted (2024 - present) still with pain and swelling -- increased inflammatory markers ESR but s/p shingrix AND was off the SSZ for 3 weeks - trend for now -- continue PT -- NO Geovani 2/2 DVT/PE -- AVOIDING TNFi - although considered now to be in remission from her recent sarcoma, concern would be a TNFi -- if this fails would also consider LEF or RTX  -- continue folic acid -- NO NSAID 2/2 aC -- continue SSZ 3 tabs BID  -- continue HCQ  # increased alk phos  chronically elevated  -- sub-serologies for aiLD is negative -- f/u with GI and pcp  # low vitamin d in the past and maintained chronically with weekly supplement  -- continue vitamin d weekly  # Medication monitoring, long term use of drug  -- quant tb negative August 2024 -- hep negative August 2024 -- NO NSAID 2/2 a/c -- NO Geovani 2/2 blood clot  -- AVOID TNFi 2/2 h/o sarcoma -- no bleedig issue at this time  [email or web address] Sukhwinder Chaidez MD - [facility] [address] [place] [address]  14 mi [phone]   ======================================== More than 50% of the encounter was spent counseling the patient on differential, workup, disease course and treatment/management.  Education was provided to the patient during this encounter.  All questions and concerns were addressed and answered.   The patient verbalized understanding and agreed to the plan.   Patient has been instructed to call for an appointment if new symptoms develop. Patient has been instructed to make a follow-up appointment in 3 months.   Time spent on the encounter included, but is not limited to, preparing to see the patient, obtaining and/or reviewing separately obtained history, performing the evaluation, counseling and educating, independently interpreting results with communication to patient, order placement, referring and/or communicating with other health professionals as described, and documenting clinical information in the electronic health record

## 2025-07-25 NOTE — HISTORY OF PRESENT ILLNESS
[de-identified] : 70-year-old presents for follow-up status post right total knee arthroplasty that was done a year ago.  Overall she is doing well with regards to her knee replacement and returning to all activity.  She has gait and balance issues at baseline due to history of stroke with no residual lower extremity weakness.  Currently ambulates without assistive devices has no pain in her knee no other complaints.

## 2025-07-25 NOTE — PHYSICAL EXAM
[LE] : Sensory: Intact in bilateral lower extremities [Normal] : no peripheral adenopathy appreciated [de-identified] : Physical exam: General: Adult female no acute distress Gait: Nonantalgic gait ambulating without assistive devices  Examination right knee: Inspection: Well-healed surgical scar no erythema no signs of infection Palpation: No significant tenderness to palpation Range of motion: 0-1 20 without significant pain Ligamentous exam: Stable with varus and valgus stress and anterior posterior drawer Motor sensor exam intact [de-identified] : 3 views of the right knee show total knee replacement implants in place no signs of osteolysis or loosening no acute bony abnormalities

## 2025-07-25 NOTE — DISCUSSION/SUMMARY
[de-identified] : Status post right total knee replacement  Plan: Patient continues to do well she can continue activity as tolerated with no restrictions.  Will plan on seeing her back in 1 to 2 years for routine surveillance x-rays of her knee replacement check return sooner if she has any problems or concerns.  All her questions were answered.